# Patient Record
Sex: FEMALE | Race: ASIAN | NOT HISPANIC OR LATINO | Employment: FULL TIME | URBAN - METROPOLITAN AREA
[De-identification: names, ages, dates, MRNs, and addresses within clinical notes are randomized per-mention and may not be internally consistent; named-entity substitution may affect disease eponyms.]

---

## 2019-05-08 ENCOUNTER — HOSPITAL ENCOUNTER (EMERGENCY)
Facility: HOSPITAL | Age: 49
Discharge: HOME | End: 2019-05-08
Attending: EMERGENCY MEDICINE | Admitting: EMERGENCY MEDICINE

## 2019-05-08 ENCOUNTER — APPOINTMENT (OUTPATIENT)
Dept: CT IMAGING | Facility: HOSPITAL | Age: 49
End: 2019-05-08

## 2019-05-08 VITALS
TEMPERATURE: 98.2 F | RESPIRATION RATE: 16 BRPM | SYSTOLIC BLOOD PRESSURE: 128 MMHG | WEIGHT: 110 LBS | HEART RATE: 87 BPM | HEIGHT: 61 IN | BODY MASS INDEX: 20.77 KG/M2 | OXYGEN SATURATION: 97 % | DIASTOLIC BLOOD PRESSURE: 77 MMHG

## 2019-05-08 DIAGNOSIS — D72.829 LEUKOCYTOSIS, UNSPECIFIED TYPE: ICD-10-CM

## 2019-05-08 DIAGNOSIS — R10.84 GENERALIZED ABDOMINAL PAIN: Primary | ICD-10-CM

## 2019-05-08 DIAGNOSIS — R31.29 MICROSCOPIC HEMATURIA: ICD-10-CM

## 2019-05-08 DIAGNOSIS — R11.2 NON-INTRACTABLE VOMITING WITH NAUSEA, UNSPECIFIED VOMITING TYPE: ICD-10-CM

## 2019-05-08 DIAGNOSIS — R93.5 ABNORMAL ABDOMINAL CT SCAN: ICD-10-CM

## 2019-05-08 LAB
ALBUMIN SERPL-MCNC: 4.3 G/DL (ref 3.5–5.2)
ALBUMIN/GLOB SERPL: 1.4 G/DL
ALP SERPL-CCNC: 39 U/L (ref 39–117)
ALT SERPL W P-5'-P-CCNC: 16 U/L (ref 1–33)
AMYLASE SERPL-CCNC: 81 U/L (ref 28–100)
ANION GAP SERPL CALCULATED.3IONS-SCNC: 10.6 MMOL/L
AST SERPL-CCNC: 19 U/L (ref 1–32)
BACTERIA UR QL AUTO: ABNORMAL /HPF
BASOPHILS # BLD AUTO: 0.04 10*3/MM3 (ref 0–0.2)
BASOPHILS NFR BLD AUTO: 0.3 % (ref 0–1.5)
BILIRUB SERPL-MCNC: 0.3 MG/DL (ref 0.2–1.2)
BILIRUB UR QL STRIP: NEGATIVE
BUN BLD-MCNC: 12 MG/DL (ref 6–20)
BUN/CREAT SERPL: 17.9 (ref 7–25)
CALCIUM SPEC-SCNC: 9.4 MG/DL (ref 8.6–10.5)
CHLORIDE SERPL-SCNC: 100 MMOL/L (ref 98–107)
CLARITY UR: CLEAR
CO2 SERPL-SCNC: 24.4 MMOL/L (ref 22–29)
COLOR UR: YELLOW
CREAT BLD-MCNC: 0.67 MG/DL (ref 0.57–1)
DEPRECATED RDW RBC AUTO: 42.1 FL (ref 37–54)
EOSINOPHIL # BLD AUTO: 0 10*3/MM3 (ref 0–0.4)
EOSINOPHIL NFR BLD AUTO: 0 % (ref 0.3–6.2)
ERYTHROCYTE [DISTWIDTH] IN BLOOD BY AUTOMATED COUNT: 12.1 % (ref 12.3–15.4)
GFR SERPL CREATININE-BSD FRML MDRD: 115 ML/MIN/1.73
GFR SERPL CREATININE-BSD FRML MDRD: 95 ML/MIN/1.73
GLOBULIN UR ELPH-MCNC: 3.1 GM/DL
GLUCOSE BLD-MCNC: 106 MG/DL (ref 65–99)
GLUCOSE UR STRIP-MCNC: ABNORMAL MG/DL
HCG SERPL QL: NEGATIVE
HCT VFR BLD AUTO: 39.4 % (ref 34–46.6)
HGB BLD-MCNC: 12.7 G/DL (ref 12–15.9)
HGB UR QL STRIP.AUTO: ABNORMAL
HYALINE CASTS UR QL AUTO: ABNORMAL /LPF
IMM GRANULOCYTES # BLD AUTO: 0.07 10*3/MM3 (ref 0–0.05)
IMM GRANULOCYTES NFR BLD AUTO: 0.5 % (ref 0–0.5)
KETONES UR QL STRIP: ABNORMAL
LEUKOCYTE ESTERASE UR QL STRIP.AUTO: NEGATIVE
LIPASE SERPL-CCNC: 18 U/L (ref 13–60)
LYMPHOCYTES # BLD AUTO: 0.65 10*3/MM3 (ref 0.7–3.1)
LYMPHOCYTES NFR BLD AUTO: 4.3 % (ref 19.6–45.3)
MCH RBC QN AUTO: 30.4 PG (ref 26.6–33)
MCHC RBC AUTO-ENTMCNC: 32.2 G/DL (ref 31.5–35.7)
MCV RBC AUTO: 94.3 FL (ref 79–97)
MONOCYTES # BLD AUTO: 0.37 10*3/MM3 (ref 0.1–0.9)
MONOCYTES NFR BLD AUTO: 2.5 % (ref 5–12)
NEUTROPHILS # BLD AUTO: 13.82 10*3/MM3 (ref 1.7–7)
NEUTROPHILS NFR BLD AUTO: 92.4 % (ref 42.7–76)
NITRITE UR QL STRIP: NEGATIVE
NRBC BLD AUTO-RTO: 0 /100 WBC (ref 0–0.2)
PH UR STRIP.AUTO: 7.5 [PH] (ref 5–8)
PLATELET # BLD AUTO: 302 10*3/MM3 (ref 140–450)
PMV BLD AUTO: 9.7 FL (ref 6–12)
POTASSIUM BLD-SCNC: 3.9 MMOL/L (ref 3.5–5.2)
PROT SERPL-MCNC: 7.4 G/DL (ref 6–8.5)
PROT UR QL STRIP: NEGATIVE
RBC # BLD AUTO: 4.18 10*6/MM3 (ref 3.77–5.28)
RBC # UR: ABNORMAL /HPF
REF LAB TEST METHOD: ABNORMAL
SODIUM BLD-SCNC: 135 MMOL/L (ref 136–145)
SP GR UR STRIP: 1.02 (ref 1–1.03)
SQUAMOUS #/AREA URNS HPF: ABNORMAL /HPF
UROBILINOGEN UR QL STRIP: ABNORMAL
WBC NRBC COR # BLD: 14.95 10*3/MM3 (ref 3.4–10.8)
WBC UR QL AUTO: ABNORMAL /HPF

## 2019-05-08 PROCEDURE — 84703 CHORIONIC GONADOTROPIN ASSAY: CPT | Performed by: NURSE PRACTITIONER

## 2019-05-08 PROCEDURE — 96361 HYDRATE IV INFUSION ADD-ON: CPT

## 2019-05-08 PROCEDURE — 83690 ASSAY OF LIPASE: CPT | Performed by: NURSE PRACTITIONER

## 2019-05-08 PROCEDURE — 74177 CT ABD & PELVIS W/CONTRAST: CPT

## 2019-05-08 PROCEDURE — 82150 ASSAY OF AMYLASE: CPT | Performed by: NURSE PRACTITIONER

## 2019-05-08 PROCEDURE — 85025 COMPLETE CBC W/AUTO DIFF WBC: CPT | Performed by: NURSE PRACTITIONER

## 2019-05-08 PROCEDURE — 99284 EMERGENCY DEPT VISIT MOD MDM: CPT

## 2019-05-08 PROCEDURE — 25010000002 ONDANSETRON PER 1 MG: Performed by: NURSE PRACTITIONER

## 2019-05-08 PROCEDURE — 96374 THER/PROPH/DIAG INJ IV PUSH: CPT

## 2019-05-08 PROCEDURE — 96375 TX/PRO/DX INJ NEW DRUG ADDON: CPT

## 2019-05-08 PROCEDURE — 80053 COMPREHEN METABOLIC PANEL: CPT | Performed by: NURSE PRACTITIONER

## 2019-05-08 PROCEDURE — 25010000002 IOPAMIDOL 61 % SOLUTION: Performed by: EMERGENCY MEDICINE

## 2019-05-08 PROCEDURE — 25010000002 MORPHINE PER 10 MG: Performed by: NURSE PRACTITIONER

## 2019-05-08 PROCEDURE — 96376 TX/PRO/DX INJ SAME DRUG ADON: CPT

## 2019-05-08 PROCEDURE — 81001 URINALYSIS AUTO W/SCOPE: CPT | Performed by: NURSE PRACTITIONER

## 2019-05-08 RX ORDER — ONDANSETRON 2 MG/ML
4 INJECTION INTRAMUSCULAR; INTRAVENOUS ONCE
Status: COMPLETED | OUTPATIENT
Start: 2019-05-08 | End: 2019-05-08

## 2019-05-08 RX ORDER — SODIUM CHLORIDE 0.9 % (FLUSH) 0.9 %
10 SYRINGE (ML) INJECTION AS NEEDED
Status: DISCONTINUED | OUTPATIENT
Start: 2019-05-08 | End: 2019-05-08 | Stop reason: HOSPADM

## 2019-05-08 RX ORDER — MORPHINE SULFATE 2 MG/ML
4 INJECTION, SOLUTION INTRAMUSCULAR; INTRAVENOUS ONCE
Status: COMPLETED | OUTPATIENT
Start: 2019-05-08 | End: 2019-05-08

## 2019-05-08 RX ORDER — PROMETHAZINE HYDROCHLORIDE 25 MG/ML
12.5 INJECTION, SOLUTION INTRAMUSCULAR; INTRAVENOUS ONCE
Status: DISCONTINUED | OUTPATIENT
Start: 2019-05-08 | End: 2019-05-08

## 2019-05-08 RX ORDER — DICYCLOMINE HCL 20 MG
20 TABLET ORAL EVERY 8 HOURS PRN
Qty: 10 TABLET | Refills: 0 | Status: SHIPPED | OUTPATIENT
Start: 2019-05-08

## 2019-05-08 RX ADMIN — IOPAMIDOL 85 ML: 612 INJECTION, SOLUTION INTRAVENOUS at 13:43

## 2019-05-08 RX ADMIN — ONDANSETRON 4 MG: 2 INJECTION INTRAMUSCULAR; INTRAVENOUS at 12:58

## 2019-05-08 RX ADMIN — SODIUM CHLORIDE 1000 ML: 9 INJECTION, SOLUTION INTRAVENOUS at 12:54

## 2019-05-08 RX ADMIN — MORPHINE SULFATE 4 MG: 2 INJECTION, SOLUTION INTRAMUSCULAR; INTRAVENOUS at 12:58

## 2019-05-08 RX ADMIN — ONDANSETRON 4 MG: 2 INJECTION INTRAMUSCULAR; INTRAVENOUS at 15:07

## 2019-05-08 NOTE — ED PROVIDER NOTES
Pt is a 48 y.o. female who presents to the ED complaining of abd pain that started 0630 this morning.  Pt admits to vomiting. Pt denies diarrhea. Pt reports hx of ovarian cyst.      On exam,  Constitutional: Pt appears nontoxic  Cardiovascular: RRR  Pulmonary: CTAB  Abdomen: soft, minimal tender diffusely      Plan: To review labs and imaging      MD ATTESTATION NOTE    The KARY and I have discussed this patient's history, physical exam, and treatment plan.  I have reviewed the documentation and personally had a face to face interaction with the patient. I affirm the documentation and agree with the treatment and plan.  The attached note describes my personal findings.      Documentation assistance provided by cheri Youngblood for Dr. Ochoa. Information recorded by the scribe was done at my direction and has been verified and validated by me.             Cary Youngblood  05/08/19 9564       Ryan Ochoa MD  05/08/19 2074

## 2019-05-08 NOTE — ED PROVIDER NOTES
"EMERGENCY DEPARTMENT ENCOUNTER    Room Number:  37/37  Date seen:  5/8/2019  Time seen: 12:39 PM  PCP: Provider, No Known    HPI:  Chief complaint:Abd pain  Context:Sean Clark is a 48 y.o. female who presents to the ED with c/o constant worsening \"cramping\" lower abd pain that started at 0630 this morning. Pt states she thought the cramping was due to needing to have a BM but the pain continued after normal BM. Pt then drank milk and vomited x2. The pain is now all over her stomach, generalized. Went went to work but left to go to [facility] who gave her nausea medication and advised her to come to ED if pain worsened. She denies fever and diarrhea. Pt states the pain is exacerbated by standing.     Onset: gradual  Location:lower abd  Radiation: none stated  Duration: 0630 this morning  Timing: constant  Character:\"cramping\"  Aggravating Factors: standing  Alleviating Factors: none stated  Severity: moderate    ALLERGIES  Prochlorperazine    PAST MEDICAL HISTORY  Active Ambulatory Problems     Diagnosis Date Noted   • No Active Ambulatory Problems     Resolved Ambulatory Problems     Diagnosis Date Noted   • No Resolved Ambulatory Problems     Past Medical History:   Diagnosis Date   • Uterine fibroid        PAST SURGICAL HISTORY  No past surgical history on file.    FAMILY HISTORY  No family history on file.    SOCIAL HISTORY  Social History     Socioeconomic History   • Marital status: Single     Spouse name: Not on file   • Number of children: Not on file   • Years of education: Not on file   • Highest education level: Not on file   Tobacco Use   • Smoking status: Former Smoker   • Smokeless tobacco: Never Used   Substance and Sexual Activity   • Alcohol use: Yes   • Drug use: No   • Sexual activity: Defer       REVIEW OF SYSTEMS  Review of Systems   Constitutional: Negative.  Negative for chills and fever.   HENT: Negative.    Eyes: Negative.    Respiratory: Negative for shortness of breath.    Cardiovascular: " Negative for chest pain.   Gastrointestinal: Positive for abdominal pain (lower) and vomiting (x2).   Genitourinary: Negative.  Negative for dysuria and hematuria.   Musculoskeletal: Negative.    Skin: Negative.  Negative for rash.   Neurological: Negative.  Negative for syncope and headaches.   Psychiatric/Behavioral: Negative.  Negative for confusion.       PHYSICAL EXAM  ED Triage Vitals [05/08/19 1226]   Temp Heart Rate Resp BP SpO2   98.2 °F (36.8 °C) 79 18 -- 98 %      Temp src Heart Rate Source Patient Position BP Location FiO2 (%)   Tympanic -- -- -- --     Physical Exam   Constitutional: She is oriented to person, place, and time. She appears distressed (appears uncomfortable).   HENT:   Head: Normocephalic and atraumatic.   Mouth/Throat: Mucous membranes are normal.   Eyes: Pupils are equal, round, and reactive to light.   Neck: Normal range of motion. Neck supple.   Cardiovascular: Normal rate, regular rhythm and normal heart sounds.   Pulmonary/Chest: Effort normal and breath sounds normal. No respiratory distress.   Abdominal: Soft. There is tenderness (generalized RLQ>LLQ). There is no rebound and no guarding.   Neurological: She is alert and oriented to person, place, and time. She has normal strength.   Skin: Skin is warm, dry and intact.   Psychiatric: Mood, affect and judgment normal.   Nursing note and vitals reviewed.      LAB RESULTS  Recent Results (from the past 24 hour(s))   Urinalysis With Microscopic If Indicated (No Culture) - Urine, Clean Catch    Collection Time: 05/08/19 12:52 PM   Result Value Ref Range    Color, UA Yellow Yellow, Straw    Appearance, UA Clear Clear    pH, UA 7.5 5.0 - 8.0    Specific Gravity, UA 1.020 1.005 - 1.030    Glucose,  mg/dL (Trace) (A) Negative    Ketones, UA 15 mg/dL (1+) (A) Negative    Bilirubin, UA Negative Negative    Blood, UA Trace (A) Negative    Protein, UA Negative Negative    Leuk Esterase, UA Negative Negative    Nitrite, UA Negative  Negative    Urobilinogen, UA 0.2 E.U./dL 0.2 - 1.0 E.U./dL   Urinalysis, Microscopic Only - Urine, Clean Catch    Collection Time: 05/08/19 12:52 PM   Result Value Ref Range    RBC, UA 3-5 (A) None Seen, 0-2 /HPF    WBC, UA 0-2 None Seen, 0-2 /HPF    Bacteria, UA None Seen None Seen /HPF    Squamous Epithelial Cells, UA 0-2 None Seen, 0-2 /HPF    Hyaline Casts, UA 0-2 None Seen /LPF    Methodology Automated Microscopy    Lipase    Collection Time: 05/08/19 12:53 PM   Result Value Ref Range    Lipase 18 13 - 60 U/L   Amylase    Collection Time: 05/08/19 12:53 PM   Result Value Ref Range    Amylase 81 28 - 100 U/L   Comprehensive Metabolic Panel    Collection Time: 05/08/19 12:53 PM   Result Value Ref Range    Glucose 106 (H) 65 - 99 mg/dL    BUN 12 6 - 20 mg/dL    Creatinine 0.67 0.57 - 1.00 mg/dL    Sodium 135 (L) 136 - 145 mmol/L    Potassium 3.9 3.5 - 5.2 mmol/L    Chloride 100 98 - 107 mmol/L    CO2 24.4 22.0 - 29.0 mmol/L    Calcium 9.4 8.6 - 10.5 mg/dL    Total Protein 7.4 6.0 - 8.5 g/dL    Albumin 4.30 3.50 - 5.20 g/dL    ALT (SGPT) 16 1 - 33 U/L    AST (SGOT) 19 1 - 32 U/L    Alkaline Phosphatase 39 39 - 117 U/L    Total Bilirubin 0.3 0.2 - 1.2 mg/dL    eGFR Non African Amer 95 >60 mL/min/1.73    eGFR  African Amer 115 >60 mL/min/1.73    Globulin 3.1 gm/dL    A/G Ratio 1.4 g/dL    BUN/Creatinine Ratio 17.9 7.0 - 25.0    Anion Gap 10.6 mmol/L   hCG, Serum, Qualitative    Collection Time: 05/08/19 12:53 PM   Result Value Ref Range    HCG Qualitative Negative Negative   CBC Auto Differential    Collection Time: 05/08/19 12:53 PM   Result Value Ref Range    WBC 14.95 (H) 3.40 - 10.80 10*3/mm3    RBC 4.18 3.77 - 5.28 10*6/mm3    Hemoglobin 12.7 12.0 - 15.9 g/dL    Hematocrit 39.4 34.0 - 46.6 %    MCV 94.3 79.0 - 97.0 fL    MCH 30.4 26.6 - 33.0 pg    MCHC 32.2 31.5 - 35.7 g/dL    RDW 12.1 (L) 12.3 - 15.4 %    RDW-SD 42.1 37.0 - 54.0 fl    MPV 9.7 6.0 - 12.0 fL    Platelets 302 140 - 450 10*3/mm3    Neutrophil %  92.4 (H) 42.7 - 76.0 %    Lymphocyte % 4.3 (L) 19.6 - 45.3 %    Monocyte % 2.5 (L) 5.0 - 12.0 %    Eosinophil % 0.0 (L) 0.3 - 6.2 %    Basophil % 0.3 0.0 - 1.5 %    Immature Grans % 0.5 0.0 - 0.5 %    Neutrophils, Absolute 13.82 (H) 1.70 - 7.00 10*3/mm3    Lymphocytes, Absolute 0.65 (L) 0.70 - 3.10 10*3/mm3    Monocytes, Absolute 0.37 0.10 - 0.90 10*3/mm3    Eosinophils, Absolute 0.00 0.00 - 0.40 10*3/mm3    Basophils, Absolute 0.04 0.00 - 0.20 10*3/mm3    Immature Grans, Absolute 0.07 (H) 0.00 - 0.05 10*3/mm3    nRBC 0.0 0.0 - 0.2 /100 WBC       I ordered the above labs and reviewed the results    RADIOLOGY  CT Abdomen Pelvis With Contrast   Final Result   Multiple small liver cysts. Tiny left renal cyst.   Retroverted uterus containing multiple masses consistent with   leiomyomas. Lobulated significant endometrial thickening within the   endometrial cavity likely due to hyperplasia. An endometrial mass cannot   be excluded. Further evaluation with ultrasound is recommended.       These findings were called to Anjali Temple in the emergency room on   05/08/2019 2:15 PM       This report was finalized on 5/8/2019 2:20 PM by Dr. Kamran Floyd M.D.              I ordered the above noted radiological studies and reviewed the images on the PACS system.  Spoke with Dr. Floyd regarding CT/MRI scan results    MEDICATIONS GIVEN IN ER  Medications   sodium chloride 0.9 % flush 10 mL (not administered)   sodium chloride 0.9 % bolus 1,000 mL (1,000 mL Intravenous Currently Infusing 5/8/19 1440)   morphine injection 4 mg (4 mg Intravenous Given 5/8/19 1258)   ondansetron (ZOFRAN) injection 4 mg (4 mg Intravenous Given 5/8/19 1258)   iopamidol (ISOVUE-300) 61 % injection 100 mL (85 mL Intravenous Given 5/8/19 1343)       PROGRESS AND CONSULTS    Progress Notes:       1312: Discussed the patient and plan of care with Dr. Ochoa. After a bedside evaluation; they agree with the plan of care.    1448: Patient rechecked. Pt  "states that they feel improvement.  I discussed today's findings with the patient, explaining any pertinent positives and negatives from today's visit, and the plan of care.  I answered any of the patient's questions.  They were given appropriate follow-up with family physician and/or specialist. Patient is aware that discharge does not mean there is nothing wrong, it indicates no emergency is present and they must continue their care with a primary care provider and/or specialist. Given instructions for BP recheck with PCP. I advised them on when to return to the ED for further evaluation. The patient was given Zofran and Imodium from [facility] this morning. The patient verbalized understanding. The patient's history, physical exam, and lab findings were discussed with the physician, who also performed a face to face history and physical exam. The patient was discharged in stable condition.     CONSULTS  1434: Phone call with Dr. Paredes. Discussed the patient, relevant history, exam, diagnostics, ED findings/progress, and concerns.    Disposition vitals:  /77   Pulse 67   Temp 98.2 °F (36.8 °C) (Tympanic)   Resp 16   Ht 154.9 cm (61\")   Wt 49.9 kg (110 lb)   LMP  (Within Weeks)   SpO2 96%   BMI 20.78 kg/m²       DIAGNOSIS  Final diagnoses:   Generalized abdominal pain   Non-intractable vomiting with nausea, unspecified vomiting type   Microscopic hematuria   Leukocytosis, unspecified type   Abnormal abdominal CT scan     DISCHARGE    Patient discharged in stable condition.    Reviewed implications of results, diagnosis, meds, responsibility to follow up, warning signs and symptoms of possible worsening, potential complications and reasons to return to ER.    Patient/Family voiced understanding of above instructions.    Discussed plan for discharge, as there is no emergent indication for admission. Patient referred to primary care provider for BP management due to today's BP. Pt/family is agreeable and " understands need for follow up and repeat testing.  Pt is aware that discharge does not mean that nothing is wrong but it indicates no emergency is present that requires admission and they must continue care with follow-up as given below or physician of their choice.     FOLLOW-UP  PATIENT LIAISON [facility] [address]  [phone]  Schedule an appointment as soon as possible for a visit   to establish primary care    Cyn Paredes MD  [address]  [place] [id number]  [phone]    Schedule an appointment as soon as possible for a visit   for follow up regarding abnormal CT scan, you need a pelvic US         Medication List      No changes were made to your prescriptions during this visit.           Documentation assistance provided by cheri Jiménez for Anjali LEONARDO.  Information recorded by the cheri was done at my direction and has been verified and validated by me.           Tino Bill  05/08/19 1443       Anjali Temple APRN  05/08/19 6315

## 2019-05-08 NOTE — ED NOTES
Pt complains of generalized abdominal pain since this morning.     Josefa Moctezuma, RN  05/08/19 3522

## 2019-05-08 NOTE — ED NOTES
Pt c/o lower abdominal pain that started at 0630 this am. Pain got worse after drinking strawberry milk. Pt vomited twice. Now reports pain has begun moving upward towards periumbilical region. denies constipation or diarrhea. Last BM this am. Has history of ovarian cyst and uterine fibroid. Pain is worse with palpation,  is slightly relieved by bending over, and is worse with standing.    Pt was also seen at [facility] this am. Sent home with nausea medication, but reports pain continues to get worse.     Margy Amador, SHANEL  05/08/19 0157       Margy Amador RN  05/08/19 1241

## 2019-05-08 NOTE — DISCHARGE INSTRUCTIONS
Rest. Drink plenty of fluids. Take zofran as needed for nausea and vomiting. There was an abnormal thickening of your uterus seen on ct scan, you will need an ultrasound outpatient to futher evaluate this and to make sure this isn't cancerous or concerning. Please call Dr Paredes with OBGYN for further evaluation. You also had a small amount of blood in your urine and your white blood cell count was slightly elevated, please follow up with a primary care provider to have these rechecked.    Follow the brat diet until symptoms improve (bananas, rice, applesauce, toast) and avoid spicy and irritating foods. Advanced diet as your symptoms tolerate.     Return to the emergency department for increased abdominal pain, nausea, vomiting, diarrhea, fever, problems with urination, bloody stools, any new or other concerns.    Followup with your family physician for ED follow up and further evaluation, call for the next available appointment.    We encourage all patients to follow up with your primary care provider for blood pressure recheck.  If you are a smoker, work on decreasing and stopping tobacco use.  Follow up with your PCP to discuss medications that may assist in tobacco cessation if interested.